# Patient Record
Sex: MALE | Race: WHITE | NOT HISPANIC OR LATINO | Employment: UNEMPLOYED | URBAN - METROPOLITAN AREA
[De-identification: names, ages, dates, MRNs, and addresses within clinical notes are randomized per-mention and may not be internally consistent; named-entity substitution may affect disease eponyms.]

---

## 2023-09-05 ENCOUNTER — HOSPITAL ENCOUNTER (EMERGENCY)
Facility: HOSPITAL | Age: 48
Discharge: HOME | End: 2023-09-05
Attending: EMERGENCY MEDICINE
Payer: COMMERCIAL

## 2023-09-05 ENCOUNTER — APPOINTMENT (EMERGENCY)
Dept: RADIOLOGY | Facility: HOSPITAL | Age: 48
End: 2023-09-05
Payer: COMMERCIAL

## 2023-09-05 VITALS
HEART RATE: 103 BPM | WEIGHT: 215 LBS | OXYGEN SATURATION: 97 % | DIASTOLIC BLOOD PRESSURE: 68 MMHG | BODY MASS INDEX: 30.1 KG/M2 | RESPIRATION RATE: 18 BRPM | HEIGHT: 71 IN | TEMPERATURE: 99.7 F | SYSTOLIC BLOOD PRESSURE: 138 MMHG

## 2023-09-05 DIAGNOSIS — U07.1 COVID-19: Primary | ICD-10-CM

## 2023-09-05 LAB
FLUAV RNA RESP QL NAA+PROBE: NEGATIVE
FLUBV RNA RESP QL NAA+PROBE: NEGATIVE
RSV RNA RESP QL NAA+PROBE: NEGATIVE
S PYO DNA THROAT QL NAA+PROBE: NOT DETECTED
SARS-COV-2 RNA RESP QL NAA+PROBE: POSITIVE

## 2023-09-05 PROCEDURE — 0241U HB NFCT DS VIR RESP RNA 4 TRGT: CPT | Performed by: PHYSICIAN ASSISTANT

## 2023-09-05 PROCEDURE — 99283 EMERGENCY DEPT VISIT LOW MDM: CPT

## 2023-09-05 PROCEDURE — 87651 STREP A DNA AMP PROBE: CPT | Performed by: PHYSICIAN ASSISTANT

## 2023-09-05 PROCEDURE — 99284 EMERGENCY DEPT VISIT MOD MDM: CPT | Performed by: PHYSICIAN ASSISTANT

## 2023-09-05 PROCEDURE — 71046 X-RAY EXAM CHEST 2 VIEWS: CPT

## 2023-09-05 NOTE — DISCHARGE INSTRUCTIONS
Your COVID test is positive. Remain in quarantine for 5 days. You may leave quarantine on day 6 if you are fever free for 24 hours. You must wear a mask until day 10. Take Tylenol and ibuprofen for fevers. Drink plenty of fluids and rest.    Please follow-up with your family doctor. Return to the ER with any worsening symptoms or trouble breathing.

## 2023-09-05 NOTE — ED PROVIDER NOTES
History  Chief Complaint   Patient presents with   • Sore Throat     Pt reports sore throat, ear pain, fevers chill since yesterday afternoon. Negative COVID test at home     41yo male with a history of opioid abuse currently on Suboxone presenting from [facility] for evaluation of a sore throat x 1 day. Symptoms began yesterday. He reports sore throat and congestion. He states it feels like he is swallowing knives. He checked his temperature this morning which was 100.9. Last dose of Advil 2 hours go. He took a home COVID test which was negative. He states that he recently starting vaping and he is wondering if this is related. History provided by:  Patient   used: No        None       History reviewed. No pertinent past medical history. Past Surgical History:   Procedure Laterality Date   • APPENDECTOMY         History reviewed. No pertinent family history. I have reviewed and agree with the history as documented. E-Cigarette/Vaping     E-Cigarette/Vaping Substances     Social History     Tobacco Use   • Smoking status: Never   • Smokeless tobacco: Never       Review of Systems   Constitutional: Positive for chills and fever. HENT: Positive for congestion and sore throat. Eyes: Negative for discharge and redness. Respiratory: Negative for cough and shortness of breath. Gastrointestinal: Negative for diarrhea and vomiting. Musculoskeletal: Negative for neck pain and neck stiffness. Skin: Negative for color change and rash. Psychiatric/Behavioral: Negative for confusion. The patient is not nervous/anxious. All other systems reviewed and are negative. Physical Exam  Physical Exam  Vitals and nursing note reviewed. Constitutional:       General: He is not in acute distress. Appearance: Normal appearance. He is not toxic-appearing. HENT:      Head: Normocephalic and atraumatic.       Right Ear: Tympanic membrane, ear canal and external ear normal.      Left Ear: Tympanic membrane, ear canal and external ear normal.      Mouth/Throat:      Mouth: Mucous membranes are moist.      Pharynx: Posterior oropharyngeal erythema present. No pharyngeal swelling or oropharyngeal exudate. Tonsils: No tonsillar exudate or tonsillar abscesses. 0 on the right. 0 on the left. Comments: Mild erythema in posterior oropharynx. No exudates. Uvula midline. No trismus. Normal phonation. Tolerating oral secretions without difficulty. Eyes:      General: No scleral icterus. Right eye: No discharge. Left eye: No discharge. Conjunctiva/sclera: Conjunctivae normal.   Cardiovascular:      Rate and Rhythm: Normal rate. Pulmonary:      Effort: Pulmonary effort is normal. No respiratory distress. Breath sounds: Normal breath sounds. No stridor. No wheezing or rales. Musculoskeletal:         General: No deformity. Normal range of motion. Cervical back: Normal range of motion and neck supple. No rigidity. Lymphadenopathy:      Cervical: No cervical adenopathy. Skin:     General: Skin is warm and dry. Neurological:      General: No focal deficit present. Mental Status: He is alert. Mental status is at baseline.    Psychiatric:         Mood and Affect: Mood normal.         Behavior: Behavior normal.         Vital Signs  ED Triage Vitals [09/05/23 0922]   Temperature Pulse Respirations Blood Pressure SpO2   99.7 °F (37.6 °C) 103 18 138/68 97 %      Temp Source Heart Rate Source Patient Position - Orthostatic VS BP Location FiO2 (%)   Oral Monitor Sitting Left arm --      Pain Score       --           Vitals:    09/05/23 0922   BP: 138/68   Pulse: 103   Patient Position - Orthostatic VS: Sitting         Visual Acuity      ED Medications  Medications - No data to display    Diagnostic Studies  Results Reviewed     Procedure Component Value Units Date/Time    FLU/RSV/COVID - if FLU/RSV clinically relevant [[id number]]  (Abnormal) Collected: 09/05/23 0945    Lab Status: Final result Specimen: Nares from Nasopharyngeal Swab Updated: 09/05/23 1038     SARS-CoV-2 Positive     INFLUENZA A PCR Negative     INFLUENZA B PCR Negative     RSV PCR Negative    Narrative:      FOR PEDIATRIC PATIENTS - copy/paste COVID Guidelines URL to browser: [email or web address]    SARS-CoV-2 assay is a Nucleic Acid Amplification assay intended for the  qualitative detection of nucleic acid from SARS-CoV-2 in nasopharyngeal  swabs. Results are for the presumptive identification of SARS-CoV-2 RNA. Positive results are indicative of infection with SARS-CoV-2, the virus  causing COVID-19, but do not rule out bacterial infection or co-infection  with other viruses. Laboratories within the [facility] and its  territories are required to report all positive results to the appropriate  public health authorities. Negative results do not preclude SARS-CoV-2  infection and should not be used as the sole basis for treatment or other  patient management decisions. Negative results must be combined with  clinical observations, patient history, and epidemiological information. This test has not been FDA cleared or approved. This test has been authorized by FDA under an Emergency Use Authorization  (EUA). This test is only authorized for the duration of time the  declaration that circumstances exist justifying the authorization of the  emergency use of an in vitro diagnostic tests for detection of SARS-CoV-2  virus and/or diagnosis of COVID-19 infection under section 564(b)(1) of  the Act, 21 U. S.C. 449BBL-5(R)(3), unless the authorization is terminated  or revoked sooner. The test has been validated but independent review by FDA  and CLIA is pending. Test performed using [employer]: This RT-PCR assay targets N2,  a region unique to SARS-CoV-2.  A conserved region in the E-gene was chosen  for pan-Sarbecovirus detection which includes SARS-CoV-2. According to CMS-2020-01-R, this platform meets the definition of high-throughput technology. Strep A PCR [[id number]]  (Normal) Collected: 09/05/23 0945    Lab Status: Final result Specimen: Throat Updated: 09/05/23 1016     STREP A PCR Not Detected                 XR chest 2 views   ED Interpretation by Twila Wells PA-C (09/05 [id number])   No acute consolidation                 Procedures  Procedures         ED Course  ED Course as of 09/05/23 1100   Tue Sep 05, 2023   1017 STREP A PCR: Not Detected   1050 SARS-COV-2(!): Positive                 SBIRT 22yo+    Flowsheet Row Most Recent Value   Initial Alcohol Screen: US AUDIT-C     1. How often do you have a drink containing alcohol? 0 Filed at: 09/05/2023 0928   2. How many drinks containing alcohol do you have on a typical day you are drinking? 0 Filed at: 09/05/2023 0928   3a. Male UNDER 65: How often do you have five or more drinks on one occasion? 0 Filed at: 09/05/2023 3195   Audit-C Score 0 Filed at: 09/05/2023 1551   PIETRO: How many times in the past year have you. .. Used an illegal drug or used a prescription medication for non-medical reasons? Never Filed at: 09/05/2023 0680                    Medical Decision Making  47yoM presenting for sore throat and fever x 1 day. Tmax 100.9. Temperature is 99.7 on arrival with otherwise normal vitals. He is well appearing in no distress. Mild erythema in posterior oropharynx. Lungs CTA and respirations non-labored. Initial ED plan: Check strep PCR, COVID swab, and CXR. Final assessment: COVID test is positive. CXR appears clear without infiltrates. No indication for admission. Supportive care and quarantine discussed. Advised close PCP follow-up. ED return precautions discussed. Patient expressed understanding and is agreeable to plan. Patient discharged in stable condition.       COVID-19: acute illness or injury  Amount and/or Complexity of Data Reviewed  Labs: ordered. Decision-making details documented in ED Course. Radiology: ordered and independent interpretation performed. Disposition  Final diagnoses:   [id number]     Time reflects when diagnosis was documented in both MDM as applicable and the Disposition within this note     Time User Action Codes Description Comment    9/5/2023 10:54 AM 1019 Ramo Prater, [address] [U07.1] COVID-19       ED Disposition     ED Disposition   Discharge    Condition   Stable    Date/Time   Tue Sep 5, 2023 10:54 AM    Comment   Nette Kidney discharge to home/self care. Follow-up Information     Follow up With Specialties Details Why Contact Info Additional Information    [address] [place] [address] Emergency Department Emergency Medicine  If symptoms worsen [address] [facility], [place], [place], [address]          Patient's Medications    No medications on file       No discharge procedures on file.     PDMP Review     None          ED Provider  Electronically Signed by           Twila Wells PA-C  09/05/23 4694